# Patient Record
Sex: MALE | Race: WHITE | NOT HISPANIC OR LATINO | URBAN - METROPOLITAN AREA
[De-identification: names, ages, dates, MRNs, and addresses within clinical notes are randomized per-mention and may not be internally consistent; named-entity substitution may affect disease eponyms.]

---

## 2023-03-08 ENCOUNTER — NURSING HOME VISIT (OUTPATIENT)
Dept: POST ACUTE CARE | Facility: EXTERNAL LOCATION | Age: 88
End: 2023-03-08
Payer: MEDICARE

## 2023-03-08 DIAGNOSIS — I10 HYPERTENSION, UNSPECIFIED TYPE: Primary | ICD-10-CM

## 2023-03-08 DIAGNOSIS — E78.5 HYPERLIPIDEMIA, UNSPECIFIED HYPERLIPIDEMIA TYPE: ICD-10-CM

## 2023-03-08 DIAGNOSIS — H35.30 MACULAR DEGENERATION OF BOTH EYES, UNSPECIFIED TYPE: ICD-10-CM

## 2023-03-08 DIAGNOSIS — H91.93 BILATERAL HEARING LOSS, UNSPECIFIED HEARING LOSS TYPE: ICD-10-CM

## 2023-03-08 PROBLEM — H91.90 HEARING LOSS: Status: ACTIVE | Noted: 2023-03-08

## 2023-03-08 PROCEDURE — 99349 HOME/RES VST EST MOD MDM 40: CPT

## 2023-03-08 NOTE — PROGRESS NOTES
Subjective   Chief complaint: Rich Medina is a 89 y.o. male who is a acute skilled care patient being seen and evaluated for multiple medical problems.  Patient presents for general medical care and follow-up.     HPI:  The patient was found lying in bed while his wife was getting evaluated in the living room of their apartment.  The patient was seen and examined at the bedside after he was awoken from his nap.  The patient is very hard of hearing and can barely see in front of him. The patient has a history of mac degeneration, HTN, Hearing loss, and HLD. He denies pain and feels okay. No issues with having BM's or urinating. The home is set up with in a very particular way so the patient can safely maneuver around on his own safely and meds organized on the counter.         Review of Systems  All systems reviewed and negative except for what was mentioned in the HPI    Vital signs: 122/64, 65, 16, 98%, 97.2    Objective   Physical Exam  Constitutional:       General: He is not in acute distress.  Eyes:      Extraocular Movements: Extraocular movements intact.   Cardiovascular:      Rate and Rhythm: Regular rhythm.   Pulmonary:      Effort: Pulmonary effort is normal.      Breath sounds: Normal breath sounds.   Abdominal:      General: Bowel sounds are normal.      Palpations: Abdomen is soft.   Musculoskeletal:      Cervical back: Neck supple.      Right lower leg: No edema.      Left lower leg: No edema.   Neurological:      Mental Status: He is alert.   Psychiatric:         Mood and Affect: Mood normal.         Behavior: Behavior is cooperative.         Assessment/Plan   Problem List Items Addressed This Visit          Circulatory    HTN (hypertension) - Primary     HCTZ, check VS monthly, monitor CBC and BMP prn            Other    HLD (hyperlipidemia)     cont statin, monitor lipid panel prn, eating whatever sounds good to him         Macular degeneration     stable, no changes in vision recently          Hearing loss     cont B/L hearing aids          Medications, treatments, and labs reviewed  Continue medications and treatments as listed in [facility]

## 2023-04-11 ENCOUNTER — NURSING HOME VISIT (OUTPATIENT)
Dept: POST ACUTE CARE | Facility: EXTERNAL LOCATION | Age: 88
End: 2023-04-11
Payer: MEDICARE

## 2023-04-11 DIAGNOSIS — H35.30 MACULAR DEGENERATION OF BOTH EYES, UNSPECIFIED TYPE: ICD-10-CM

## 2023-04-11 DIAGNOSIS — E78.5 HYPERLIPIDEMIA, UNSPECIFIED HYPERLIPIDEMIA TYPE: ICD-10-CM

## 2023-04-11 DIAGNOSIS — H91.93 BILATERAL HEARING LOSS, UNSPECIFIED HEARING LOSS TYPE: ICD-10-CM

## 2023-04-11 DIAGNOSIS — I15.9 SECONDARY HYPERTENSION: Primary | ICD-10-CM

## 2023-04-11 PROCEDURE — 99305 1ST NF CARE MODERATE MDM 35: CPT | Performed by: INTERNAL MEDICINE

## 2023-04-11 NOTE — PROGRESS NOTES
HISTORY & PHYSICAL    Subjective   Chief complaint: Rich Medina is a 89 y.o. male who is a acute skilled care patient being seen and evaluated for multiple medical problems.  Patient presents for weakness.    HPI:  The patient is a 89 year old male with a past medical history of macular degeneration, HTN, hearing loss and HLD. Pt was sent to SNF due to difficulty hearing, nurse states he can barely see in front of him. Pt will stay at SNF for further care and therapy.         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    Family History   Problem Relation Name Age of Onset    No Known Problems Mother      No Known Problems Father         Social History     Socioeconomic History    Marital status:      Spouse name: Not on file    Number of children: Not on file    Years of education: Not on file    Highest education level: Not on file   Occupational History    Not on file   Tobacco Use    Smoking status: Not on file    Smokeless tobacco: Not on file   Vaping Use    Vaping status: Not on file   Substance and Sexual Activity    Alcohol use: Not on file    Drug use: Not on file    Sexual activity: Not on file   Other Topics Concern    Not on file   Social History Narrative    Not on file     Social Determinants of Health     Financial Resource Strain: Not on file   Food Insecurity: Not on file   Transportation Needs: Not on file   Physical Activity: Not on file   Stress: Not on file   Social Connections: Not on file   Intimate Partner Violence: Not on file   Housing Stability: Not on file       Vital signs: 124/64, 97.8, 74, 18, 97%    Objective   Physical Exam  Vitals reviewed.   Constitutional:       Appearance: Normal appearance.   HENT:      Head: Normocephalic and atraumatic.   Cardiovascular:      Rate and Rhythm:  Normal rate and regular rhythm.   Pulmonary:      Effort: Pulmonary effort is normal.      Breath sounds: Normal breath sounds.   Abdominal:      General: Bowel sounds are normal.      Palpations: Abdomen is soft.   Musculoskeletal:      Cervical back: Neck supple.   Skin:     General: Skin is warm and dry.   Neurological:      General: No focal deficit present.      Mental Status: He is alert.   Psychiatric:         Mood and Affect: Mood normal.         Behavior: Behavior is cooperative.         Assessment/Plan   Problem List Items Addressed This Visit          Circulatory    HTN (hypertension) - Primary       Other    HLD (hyperlipidemia)    Macular degeneration    Hearing loss     Medications, treatments, and labs reviewed  Continue medications and treatments as listed in [facility]    Scribe Attestation  I, Tika Oliveira   attest that this documentation has been prepared under the direction and in the presence of Mikhail Gracia MD.    Provider Attestation - Scribe documentation  All medical record entries made by the Scribe were at my direction and personally dictated by me. I have reviewed the chart and agree that the record accurately reflects my personal performance of the history, physical exam, discussion and plan.    Mikhail Gracia MD

## 2023-04-11 NOTE — LETTER
Patient: Rich Medina  : 1928    Encounter Date: 2023    HISTORY & PHYSICAL    Subjective  Chief complaint: Rich Medina is a 89 y.o. male who is a acute skilled care patient being seen and evaluated for multiple medical problems.  Patient presents for weakness.    HPI:  The patient is a 89 year old male with a past medical history of macular degeneration, HTN, hearing loss and HLD. Pt was sent to SNF due to difficulty hearing, nurse states he can barely see in front of him. Pt will stay at SNF for further care and therapy.         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    Family History   Problem Relation Name Age of Onset   • No Known Problems Mother     • No Known Problems Father         Social History     Socioeconomic History   • Marital status:      Spouse name: Not on file   • Number of children: Not on file   • Years of education: Not on file   • Highest education level: Not on file   Occupational History   • Not on file   Tobacco Use   • Smoking status: Not on file   • Smokeless tobacco: Not on file   Vaping Use   • Vaping status: Not on file   Substance and Sexual Activity   • Alcohol use: Not on file   • Drug use: Not on file   • Sexual activity: Not on file   Other Topics Concern   • Not on file   Social History Narrative   • Not on file     Social Determinants of Health     Financial Resource Strain: Not on file   Food Insecurity: Not on file   Transportation Needs: Not on file   Physical Activity: Not on file   Stress: Not on file   Social Connections: Not on file   Intimate Partner Violence: Not on file   Housing Stability: Not on file       Vital signs: 124/64, 97.8, 74, 18, 97%    Objective  Physical Exam  Vitals reviewed.   Constitutional:       Appearance: Normal appearance.    HENT:      Head: Normocephalic and atraumatic.   Cardiovascular:      Rate and Rhythm: Normal rate and regular rhythm.   Pulmonary:      Effort: Pulmonary effort is normal.      Breath sounds: Normal breath sounds.   Abdominal:      General: Bowel sounds are normal.      Palpations: Abdomen is soft.   Musculoskeletal:      Cervical back: Neck supple.   Skin:     General: Skin is warm and dry.   Neurological:      General: No focal deficit present.      Mental Status: He is alert.   Psychiatric:         Mood and Affect: Mood normal.         Behavior: Behavior is cooperative.         Assessment/Plan  Problem List Items Addressed This Visit          Circulatory    HTN (hypertension) - Primary       Other    HLD (hyperlipidemia)    Macular degeneration    Hearing loss     Medications, treatments, and labs reviewed  Continue medications and treatments as listed in [facility]    Scribe Attestation  IAdina Scribe   attest that this documentation has been prepared under the direction and in the presence of Mikhail Gracia MD.    Provider Attestation - Scribe documentation  All medical record entries made by the Scribe were at my direction and personally dictated by me. I have reviewed the chart and agree that the record accurately reflects my personal performance of the history, physical exam, discussion and plan.    Mikhail Gracia MD          Electronically Signed By: Mikhail Gracia MD   4/11/23  7:08 PM

## 2023-04-13 ENCOUNTER — NURSING HOME VISIT (OUTPATIENT)
Dept: POST ACUTE CARE | Facility: EXTERNAL LOCATION | Age: 88
End: 2023-04-13
Payer: MEDICARE

## 2023-04-13 DIAGNOSIS — R53.1 WEAKNESS: ICD-10-CM

## 2023-04-13 DIAGNOSIS — R62.7 FAILURE TO THRIVE IN ADULT: ICD-10-CM

## 2023-04-13 PROCEDURE — 99308 SBSQ NF CARE LOW MDM 20: CPT | Performed by: INTERNAL MEDICINE

## 2023-04-13 NOTE — LETTER
Patient: Rich Medina  : 1928    Encounter Date: 2023    PROGRESS NOTE    Subjective  Chief complaint: Rich Medina is a 89 y.o. male who is an acute skilled patient being seen and evaluated for weakness    HPI:  Patient working in therapy due to weakness. HE requires moderate assistance for transfers. Nurse called and reported patient had episode of unresponsiveness. Patient has been having deterioration in condition and cognition as well as increased confusion. Presently he is resting comfortable. No other concerns at this time.       Objective  Vital signs: 112/68, 96%    Physical Exam  Constitutional:       General: He is not in acute distress.     Comments: Increased lethargy   Eyes:      Extraocular Movements: Extraocular movements intact.   Cardiovascular:      Rate and Rhythm: Normal rate and regular rhythm.   Pulmonary:      Effort: Pulmonary effort is normal.      Breath sounds: Normal breath sounds.   Abdominal:      General: Bowel sounds are normal.      Palpations: Abdomen is soft.   Musculoskeletal:         General: Normal range of motion.      Cervical back: Normal range of motion and neck supple.      Right lower leg: No edema.      Left lower leg: No edema.   Psychiatric:         Behavior: Behavior is cooperative.         Assessment/Plan  Problem List Items Addressed This Visit          Endocrine/Metabolic    Failure to thrive in adult     Noted deterioration  Increased confusion  Family contemplating Hospice care and comfort masures            Other    Weakness     Therapy          Medications, treatments, and labs reviewed  Continue medications and treatments as listed in [facility]    Scribe Attestation  By signing my name below, Yi PIRES Scribe   attest that this documentation has been prepared under the direction and in the presence of Mikhail Gracia MD.    Provider Attestation - Scribe documentation  All medical record entries made by the Scribe were at my direction and  personally dictated by me. I have reviewed the chart and agree that the record accurately reflects my personal performance of the history, physical exam, discussion and plan.  1. Failure to thrive in adult        2. Weakness               Electronically Signed By: Mikhail Gracia MD   4/18/23  6:29 PM

## 2023-04-17 PROBLEM — R62.7 FAILURE TO THRIVE IN ADULT: Status: ACTIVE | Noted: 2023-04-17

## 2023-04-17 PROBLEM — R53.1 WEAKNESS: Status: ACTIVE | Noted: 2023-04-17

## 2023-04-17 NOTE — PROGRESS NOTES
PROGRESS NOTE    Subjective   Chief complaint: Rich Medina is a 89 y.o. male who is an acute skilled patient being seen and evaluated for weakness    HPI:  Patient working in therapy due to weakness. HE requires moderate assistance for transfers. Nurse called and reported patient had episode of unresponsiveness. Patient has been having deterioration in condition and cognition as well as increased confusion. Presently he is resting comfortable. No other concerns at this time.       Objective   Vital signs: 112/68, 96%    Physical Exam  Constitutional:       General: He is not in acute distress.     Comments: Increased lethargy   Eyes:      Extraocular Movements: Extraocular movements intact.   Cardiovascular:      Rate and Rhythm: Normal rate and regular rhythm.   Pulmonary:      Effort: Pulmonary effort is normal.      Breath sounds: Normal breath sounds.   Abdominal:      General: Bowel sounds are normal.      Palpations: Abdomen is soft.   Musculoskeletal:         General: Normal range of motion.      Cervical back: Normal range of motion and neck supple.      Right lower leg: No edema.      Left lower leg: No edema.   Psychiatric:         Behavior: Behavior is cooperative.         Assessment/Plan   Problem List Items Addressed This Visit          Endocrine/Metabolic    Failure to thrive in adult     Noted deterioration  Increased confusion  Family contemplating Hospice care and comfort masures            Other    Weakness     Therapy          Medications, treatments, and labs reviewed  Continue medications and treatments as listed in [facility]    Scribe Attestation  By signing my name below, I, Tiak Chisholm   attest that this documentation has been prepared under the direction and in the presence of Mikhail Gracia MD.    Provider Attestation - Scribe documentation  All medical record entries made by the Scribe were at my direction and personally dictated by me. I have reviewed the chart and agree that the  record accurately reflects my personal performance of the history, physical exam, discussion and plan.  1. Failure to thrive in adult        2. Weakness

## 2023-04-18 ENCOUNTER — NURSING HOME VISIT (OUTPATIENT)
Dept: POST ACUTE CARE | Facility: EXTERNAL LOCATION | Age: 88
End: 2023-04-18
Payer: MEDICARE

## 2023-04-18 DIAGNOSIS — R53.1 WEAKNESS: ICD-10-CM

## 2023-04-18 DIAGNOSIS — R62.7 FAILURE TO THRIVE IN ADULT: ICD-10-CM

## 2023-04-18 PROCEDURE — 99308 SBSQ NF CARE LOW MDM 20: CPT | Performed by: INTERNAL MEDICINE

## 2023-04-18 NOTE — LETTER
Patient: Rich Medina  : 1928    Encounter Date: 2023    PROGRESS NOTE    Subjective  Chief complaint: Rich Medina is a 89 y.o. male who is an acute skilled patient being seen and evaluated for weakness    HPI:    Therapy continues to work with patient.  Patient is weaker and continues to have a decline in condition.  Family still considering hospice.  No new issues or concerns.  No acute distress.      Objective  Vital signs: 126/74, 98%    Physical Exam  Constitutional:       General: He is not in acute distress.  Eyes:      Extraocular Movements: Extraocular movements intact.   Cardiovascular:      Rate and Rhythm: Normal rate and regular rhythm.   Pulmonary:      Effort: Pulmonary effort is normal.      Breath sounds: Normal breath sounds.   Abdominal:      General: Bowel sounds are normal.      Palpations: Abdomen is soft.   Musculoskeletal:         General: Normal range of motion.      Cervical back: Normal range of motion and neck supple.      Right lower leg: No edema.      Left lower leg: No edema.   Neurological:      Mental Status: He is alert.   Psychiatric:         Mood and Affect: Mood normal.         Behavior: Behavior is cooperative.         Assessment/Plan  Problem List Items Addressed This Visit          Endocrine/Metabolic    Failure to thrive in adult     Noted deterioration  Increased confusion  Family considering Hospice             Other    Weakness     Therapy          Medications, treatments, and labs reviewed  Continue medications and treatments as listed in [facility]    Mikhail Gracia MD    1. Failure to thrive in adult        2. Weakness             Scribe Attestation  By signing my name below, IYi Scribe   attest that this documentation has been prepared under the direction and in the presence of Mikhail Gracia MD.    Provider Attestation - Scribe documentation  All medical record entries made by the Scribe were at my direction and personally dictated by me. I  have reviewed the chart and agree that the record accurately reflects my personal performance of the history, physical exam, discussion and plan.      Electronically Signed By: Mikhail Gracia MD   4/20/23  6:07 PM

## 2023-04-20 ENCOUNTER — NURSING HOME VISIT (OUTPATIENT)
Dept: POST ACUTE CARE | Facility: EXTERNAL LOCATION | Age: 88
End: 2023-04-20
Payer: MEDICARE

## 2023-04-20 DIAGNOSIS — R53.1 WEAKNESS: ICD-10-CM

## 2023-04-20 DIAGNOSIS — R62.7 FAILURE TO THRIVE IN ADULT: ICD-10-CM

## 2023-04-20 PROCEDURE — 99308 SBSQ NF CARE LOW MDM 20: CPT | Performed by: INTERNAL MEDICINE

## 2023-04-20 NOTE — LETTER
Patient: Rich Medina  : 1928    Encounter Date: 2023    PROGRESS NOTE    Subjective  Chief complaint: Rich Medina is a 89 y.o. male who is an acute skilled patient being seen and evaluated for weakness    HPI:  Patient with increased weakness working in therapy due to weakness. Requires moderate assistance for transfers and ADL's. Patient with continued noted deterioration. No new issues at this time. No acute distress.       Objective  Vital signs: 113/76, 98%    Physical Exam  Constitutional:       General: He is not in acute distress.     Appearance: He is ill-appearing.   Eyes:      Extraocular Movements: Extraocular movements intact.   Cardiovascular:      Rate and Rhythm: Normal rate and regular rhythm.   Pulmonary:      Effort: Pulmonary effort is normal.      Breath sounds: Normal breath sounds.   Abdominal:      General: Bowel sounds are normal.      Palpations: Abdomen is soft.   Musculoskeletal:         General: Normal range of motion.      Cervical back: Normal range of motion and neck supple.      Right lower leg: No edema.      Left lower leg: No edema.   Neurological:      Mental Status: He is alert.   Psychiatric:         Mood and Affect: Mood normal.         Behavior: Behavior is cooperative.         Assessment/Plan  Problem List Items Addressed This Visit          Endocrine/Metabolic    Failure to thrive in adult     Noted deterioration  Increased confusion  Family considering Hospice             Other    Weakness     Therapy          Medications, treatments, and labs reviewed  Continue medications and treatments as listed in [facility]    Mikhail Gracia MD    1. Weakness        2. Failure to thrive in adult             Scribe Attestation  By signing my name below, Yi PIRES, Scribe   attest that this documentation has been prepared under the direction and in the presence of Mikhail Gracia MD.    Provider Attestation - Scribe documentation  All medical record entries made by the  Scribe were at my direction and personally dictated by me. I have reviewed the chart and agree that the record accurately reflects my personal performance of the history, physical exam, discussion and plan.      Electronically Signed By: Mikhail Gracia MD   4/24/23  7:22 PM

## 2023-04-20 NOTE — PROGRESS NOTES
PROGRESS NOTE    Subjective   Chief complaint: Rich Medina is a 89 y.o. male who is an acute skilled patient being seen and evaluated for weakness    HPI:    Therapy continues to work with patient.  Patient is weaker and continues to have a decline in condition.  Family still considering hospice.  No new issues or concerns.  No acute distress.      Objective   Vital signs: 126/74, 98%    Physical Exam  Constitutional:       General: He is not in acute distress.  Eyes:      Extraocular Movements: Extraocular movements intact.   Cardiovascular:      Rate and Rhythm: Normal rate and regular rhythm.   Pulmonary:      Effort: Pulmonary effort is normal.      Breath sounds: Normal breath sounds.   Abdominal:      General: Bowel sounds are normal.      Palpations: Abdomen is soft.   Musculoskeletal:         General: Normal range of motion.      Cervical back: Normal range of motion and neck supple.      Right lower leg: No edema.      Left lower leg: No edema.   Neurological:      Mental Status: He is alert.   Psychiatric:         Mood and Affect: Mood normal.         Behavior: Behavior is cooperative.         Assessment/Plan   Problem List Items Addressed This Visit          Endocrine/Metabolic    Failure to thrive in adult     Noted deterioration  Increased confusion  Family considering Hospice             Other    Weakness     Therapy          Medications, treatments, and labs reviewed  Continue medications and treatments as listed in [facility]    Mikhail Gracia MD    1. Failure to thrive in adult        2. Weakness             Scribe Attestation  By signing my name below, I, Tika Chisholm   attest that this documentation has been prepared under the direction and in the presence of Mikhail Gracia MD.    Provider Attestation - Scribe documentation  All medical record entries made by the Scribe were at my direction and personally dictated by me. I have reviewed the chart and agree that the record accurately reflects  my personal performance of the history, physical exam, discussion and plan.

## 2023-04-24 NOTE — PROGRESS NOTES
PROGRESS NOTE    Subjective   Chief complaint: Rich Medina is a 89 y.o. male who is an acute skilled patient being seen and evaluated for weakness    HPI:  Patient with increased weakness working in therapy due to weakness. Requires moderate assistance for transfers and ADL's. Patient with continued noted deterioration. No new issues at this time. No acute distress.       Objective   Vital signs: 113/76, 98%    Physical Exam  Constitutional:       General: He is not in acute distress.     Appearance: He is ill-appearing.   Eyes:      Extraocular Movements: Extraocular movements intact.   Cardiovascular:      Rate and Rhythm: Normal rate and regular rhythm.   Pulmonary:      Effort: Pulmonary effort is normal.      Breath sounds: Normal breath sounds.   Abdominal:      General: Bowel sounds are normal.      Palpations: Abdomen is soft.   Musculoskeletal:         General: Normal range of motion.      Cervical back: Normal range of motion and neck supple.      Right lower leg: No edema.      Left lower leg: No edema.   Neurological:      Mental Status: He is alert.   Psychiatric:         Mood and Affect: Mood normal.         Behavior: Behavior is cooperative.         Assessment/Plan   Problem List Items Addressed This Visit          Endocrine/Metabolic    Failure to thrive in adult     Noted deterioration  Increased confusion  Family considering Hospice             Other    Weakness     Therapy          Medications, treatments, and labs reviewed  Continue medications and treatments as listed in [facility]    Mikhail Gracia MD    1. Weakness        2. Failure to thrive in adult             Scribe Attestation  By signing my name below, I, Tika Chisholm   attest that this documentation has been prepared under the direction and in the presence of Mikhail Gracia MD.    Provider Attestation - Scribe documentation  All medical record entries made by the Scribe were at my direction and personally dictated by me. I have  reviewed the chart and agree that the record accurately reflects my personal performance of the history, physical exam, discussion and plan.

## 2023-04-25 ENCOUNTER — NURSING HOME VISIT (OUTPATIENT)
Dept: POST ACUTE CARE | Facility: EXTERNAL LOCATION | Age: 88
End: 2023-04-25
Payer: MEDICARE

## 2023-04-25 DIAGNOSIS — R53.1 WEAKNESS: ICD-10-CM

## 2023-04-25 PROCEDURE — 99308 SBSQ NF CARE LOW MDM 20: CPT | Performed by: INTERNAL MEDICINE

## 2023-04-25 NOTE — LETTER
Patient: Rich Medina  : 1928    Encounter Date: 2023    PROGRESS NOTE    Subjective  Chief complaint: Rich Medina is a 89 y.o. male who is a long term care patient being seen and evaluated for weakness    HPI:  Patient continues to work in physical and occupational therapies.  Patient requires assistance for transfers ADLs and mobility.  He continues to have a general decline in condition.  He is weaker and oftentimes more lethargic.  No new issues or concerns.  No acute distress.      Objective  Vital signs: 116/67, 97%    Physical Exam  Constitutional:       General: He is not in acute distress.  Eyes:      Extraocular Movements: Extraocular movements intact.   Cardiovascular:      Rate and Rhythm: Normal rate and regular rhythm.   Pulmonary:      Effort: Pulmonary effort is normal.      Breath sounds: Normal breath sounds.   Abdominal:      General: Bowel sounds are normal.      Palpations: Abdomen is soft.   Musculoskeletal:         General: Normal range of motion.      Cervical back: Neck supple.      Right lower leg: No edema.      Left lower leg: No edema.   Neurological:      Mental Status: He is alert.   Psychiatric:         Mood and Affect: Mood normal.         Behavior: Behavior is cooperative.         Assessment/Plan  Problem List Items Addressed This Visit          Other    Weakness     Therapy  +weakness          Medications, treatments, and labs reviewed  Continue medications and treatments as listed in [facility]    Scribe Attestation  By signing my name below, I, Tika Chisholm   attest that this documentation has been prepared under the direction and in the presence of Mikhail Gracia MD.    Provider Attestation - Scribe documentation  All medical record entries made by the Scribe were at my direction and personally dictated by me. I have reviewed the chart and agree that the record accurately reflects my personal performance of the history, physical exam, discussion and  plan.    1. Weakness               Electronically Signed By: Mikhail Gracia MD   4/26/23  5:45 PM

## 2023-04-26 NOTE — PROGRESS NOTES
PROGRESS NOTE    Subjective   Chief complaint: Rich Medina is a 89 y.o. male who is a long term care patient being seen and evaluated for weakness    HPI:  Patient continues to work in physical and occupational therapies.  Patient requires assistance for transfers ADLs and mobility.  He continues to have a general decline in condition.  He is weaker and oftentimes more lethargic.  No new issues or concerns.  No acute distress.      Objective   Vital signs: 116/67, 97%    Physical Exam  Constitutional:       General: He is not in acute distress.  Eyes:      Extraocular Movements: Extraocular movements intact.   Cardiovascular:      Rate and Rhythm: Normal rate and regular rhythm.   Pulmonary:      Effort: Pulmonary effort is normal.      Breath sounds: Normal breath sounds.   Abdominal:      General: Bowel sounds are normal.      Palpations: Abdomen is soft.   Musculoskeletal:         General: Normal range of motion.      Cervical back: Neck supple.      Right lower leg: No edema.      Left lower leg: No edema.   Neurological:      Mental Status: He is alert.   Psychiatric:         Mood and Affect: Mood normal.         Behavior: Behavior is cooperative.         Assessment/Plan   Problem List Items Addressed This Visit          Other    Weakness     Therapy  +weakness          Medications, treatments, and labs reviewed  Continue medications and treatments as listed in [facility]    Scribe Attestation  By signing my name below, IYi Scribe   attest that this documentation has been prepared under the direction and in the presence of Mikhail Gracia MD.    Provider Attestation - Scribe documentation  All medical record entries made by the Scribe were at my direction and personally dictated by me. I have reviewed the chart and agree that the record accurately reflects my personal performance of the history, physical exam, discussion and plan.    1. Weakness

## 2023-05-02 ENCOUNTER — NURSING HOME VISIT (OUTPATIENT)
Dept: POST ACUTE CARE | Facility: EXTERNAL LOCATION | Age: 88
End: 2023-05-02
Payer: MEDICARE

## 2023-05-02 DIAGNOSIS — H91.93 BILATERAL HEARING LOSS, UNSPECIFIED HEARING LOSS TYPE: ICD-10-CM

## 2023-05-02 DIAGNOSIS — E78.5 HYPERLIPIDEMIA, UNSPECIFIED HYPERLIPIDEMIA TYPE: ICD-10-CM

## 2023-05-02 DIAGNOSIS — I15.9 SECONDARY HYPERTENSION: ICD-10-CM

## 2023-05-02 PROCEDURE — 99309 SBSQ NF CARE MODERATE MDM 30: CPT | Performed by: INTERNAL MEDICINE

## 2023-05-02 NOTE — LETTER
Patient: Rich Medina  : 1928    Encounter Date: 2023    PROGRESS NOTE    Subjective  Chief complaint: Rich Medina is a 89 y.o. male who is a long term care patient being seen and evaluated for monthly general medical care and follow-up.    HPI:  Patient presents for general medical care and f/u.  Patient seen and examined at bedside.  No issues per nursing.  Patient has no acute complaints.    HTN,  Denies chest pain and headache.  Denies muscle aches. Patient with hearing loss has hearing aides and is doing well with them No acute distress.       Objective  Vital signs: 126/73, 98%    Physical Exam  Constitutional:       General: He is not in acute distress.  Eyes:      Extraocular Movements: Extraocular movements intact.   Cardiovascular:      Rate and Rhythm: Normal rate and regular rhythm.   Pulmonary:      Effort: Pulmonary effort is normal.      Breath sounds: Normal breath sounds.   Abdominal:      General: Bowel sounds are normal.      Palpations: Abdomen is soft.   Musculoskeletal:      Cervical back: Neck supple.      Right lower leg: No edema.      Left lower leg: No edema.   Neurological:      Mental Status: He is alert.   Psychiatric:         Mood and Affect: Mood normal.         Behavior: Behavior is cooperative.         Assessment/Plan  Problem List Items Addressed This Visit          Circulatory    HTN (hypertension)     HCTZ, check VS monthly, monitor CBC and BMP prn            Other    HLD (hyperlipidemia)     cont statin, monitor lipid panel prn,          Hearing loss     cont B/L hearing aids          Medications, treatments, and labs reviewed  Continue medications and treatments as listed in [facility]    Scribe Attestation  By signing my name below, Yi PIRES Scribtiki   attest that this documentation has been prepared under the direction and in the presence of Mikhail Gracia MD.    Provider Attestation - Scribe documentation  All medical record entries made by the Scribe were  at my direction and personally dictated by me. I have reviewed the chart and agree that the record accurately reflects my personal performance of the history, physical exam, discussion and plan.    1. Hyperlipidemia, unspecified hyperlipidemia type        2. Secondary hypertension        3. Bilateral hearing loss, unspecified hearing loss type               Electronically Signed By: Mikhail Gracia MD   5/3/23  4:24 PM

## 2023-05-03 NOTE — PROGRESS NOTES
PROGRESS NOTE    Subjective   Chief complaint: Rich Mdeina is a 89 y.o. male who is a long term care patient being seen and evaluated for monthly general medical care and follow-up.    HPI:  Patient presents for general medical care and f/u.  Patient seen and examined at bedside.  No issues per nursing.  Patient has no acute complaints.    HTN,  Denies chest pain and headache.  Denies muscle aches. Patient with hearing loss has hearing aides and is doing well with them No acute distress.       Objective   Vital signs: 126/73, 98%    Physical Exam  Constitutional:       General: He is not in acute distress.  Eyes:      Extraocular Movements: Extraocular movements intact.   Cardiovascular:      Rate and Rhythm: Normal rate and regular rhythm.   Pulmonary:      Effort: Pulmonary effort is normal.      Breath sounds: Normal breath sounds.   Abdominal:      General: Bowel sounds are normal.      Palpations: Abdomen is soft.   Musculoskeletal:      Cervical back: Neck supple.      Right lower leg: No edema.      Left lower leg: No edema.   Neurological:      Mental Status: He is alert.   Psychiatric:         Mood and Affect: Mood normal.         Behavior: Behavior is cooperative.         Assessment/Plan   Problem List Items Addressed This Visit          Circulatory    HTN (hypertension)     HCTZ, check VS monthly, monitor CBC and BMP prn            Other    HLD (hyperlipidemia)     cont statin, monitor lipid panel prn,          Hearing loss     cont B/L hearing aids          Medications, treatments, and labs reviewed  Continue medications and treatments as listed in [facility]    Scribe Attestation  By signing my name below, Yi PIRES Scribe   attest that this documentation has been prepared under the direction and in the presence of Mikhail Gracia MD.    Provider Attestation - Scribe documentation  All medical record entries made by the Scribe were at my direction and personally dictated by me. I have reviewed the  chart and agree that the record accurately reflects my personal performance of the history, physical exam, discussion and plan.    1. Hyperlipidemia, unspecified hyperlipidemia type        2. Secondary hypertension        3. Bilateral hearing loss, unspecified hearing loss type

## 2023-05-04 ENCOUNTER — NURSING HOME VISIT (OUTPATIENT)
Dept: POST ACUTE CARE | Facility: EXTERNAL LOCATION | Age: 88
End: 2023-05-04
Payer: MEDICARE

## 2023-05-04 DIAGNOSIS — R53.1 WEAKNESS: ICD-10-CM

## 2023-05-04 DIAGNOSIS — I15.9 SECONDARY HYPERTENSION: ICD-10-CM

## 2023-05-04 PROCEDURE — 99308 SBSQ NF CARE LOW MDM 20: CPT | Performed by: INTERNAL MEDICINE

## 2023-05-04 NOTE — LETTER
Patient: Rich Medina  : 1928    Encounter Date: 2023    PROGRESS NOTE    Subjective  Chief complaint: Rich Medina is a 89 y.o. 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  Patient requires assistance for transfers ADLs and mobility.  Patient denies pain or discomfort.  No new issues or concerns.  No acute distress.      Objective  Vital signs: 126/73, 97%    Physical Exam  Constitutional:       General: He is not in acute distress.  Eyes:      Extraocular Movements: Extraocular movements intact.   Cardiovascular:      Rate and Rhythm: Normal rate and regular rhythm.   Pulmonary:      Effort: Pulmonary effort is normal.      Breath sounds: Normal breath sounds.   Abdominal:      General: Bowel sounds are normal.      Palpations: Abdomen is soft.   Musculoskeletal:      Cervical back: Neck supple.      Right lower leg: No edema.      Left lower leg: No edema.      Comments: Gen weakness   Neurological:      Mental Status: He is alert.   Psychiatric:         Mood and Affect: Mood normal.         Behavior: Behavior is cooperative.         Assessment/Plan  Problem List Items Addressed This Visit          Circulatory    HTN (hypertension)     HCTZ, check VS monthly, monitor CBC and BMP prn            Other    Weakness     Therapy  +weakness          Medications, treatments, and labs reviewed  Continue medications and treatments as listed in [facility]    Scribe Attestation  By signing my name below, I, Tika Chisholm   attest that this documentation has been prepared under the direction and in the presence of Mikhail Gracia MD.    Provider Attestation - Scribe documentation  All medical record entries made by the Scribe were at my direction and personally dictated by me. I have reviewed the chart and agree that the record accurately reflects my personal performance of the history,  physical exam, discussion and plan.    1. Weakness        2. Secondary hypertension               Electronically Signed By: Mikhail Gracia MD   5/5/23  3:23 PM

## 2023-05-05 NOTE — PROGRESS NOTES
PROGRESS NOTE    Subjective   Chief complaint: Rich Medina is a 89 y.o. 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  Patient requires assistance for transfers ADLs and mobility.  Patient denies pain or discomfort.  No new issues or concerns.  No acute distress.      Objective   Vital signs: 126/73, 97%    Physical Exam  Constitutional:       General: He is not in acute distress.  Eyes:      Extraocular Movements: Extraocular movements intact.   Cardiovascular:      Rate and Rhythm: Normal rate and regular rhythm.   Pulmonary:      Effort: Pulmonary effort is normal.      Breath sounds: Normal breath sounds.   Abdominal:      General: Bowel sounds are normal.      Palpations: Abdomen is soft.   Musculoskeletal:      Cervical back: Neck supple.      Right lower leg: No edema.      Left lower leg: No edema.      Comments: Gen weakness   Neurological:      Mental Status: He is alert.   Psychiatric:         Mood and Affect: Mood normal.         Behavior: Behavior is cooperative.         Assessment/Plan   Problem List Items Addressed This Visit          Circulatory    HTN (hypertension)     HCTZ, check VS monthly, monitor CBC and BMP prn            Other    Weakness     Therapy  +weakness          Medications, treatments, and labs reviewed  Continue medications and treatments as listed in [facility]    Scribe Attestation  By signing my name below, IYi Scribe   attest that this documentation has been prepared under the direction and in the presence of Mikhail Gracia MD.    Provider Attestation - Scribe documentation  All medical record entries made by the Scribe were at my direction and personally dictated by me. I have reviewed the chart and agree that the record accurately reflects my personal performance of the history, physical exam, discussion and plan.    1. Weakness        2. Secondary  hypertension

## 2023-05-09 ENCOUNTER — NURSING HOME VISIT (OUTPATIENT)
Dept: POST ACUTE CARE | Facility: EXTERNAL LOCATION | Age: 88
End: 2023-05-09
Payer: MEDICARE

## 2023-05-09 DIAGNOSIS — F03.90 DEMENTIA, UNSPECIFIED DEMENTIA SEVERITY, UNSPECIFIED DEMENTIA TYPE, UNSPECIFIED WHETHER BEHAVIORAL, PSYCHOTIC, OR MOOD DISTURBANCE OR ANXIETY (MULTI): ICD-10-CM

## 2023-05-09 DIAGNOSIS — R53.1 WEAKNESS: ICD-10-CM

## 2023-05-09 PROCEDURE — 99309 SBSQ NF CARE MODERATE MDM 30: CPT | Performed by: INTERNAL MEDICINE

## 2023-05-09 NOTE — LETTER
Patient: Rich Medina  : 1928    Encounter Date: 2023    PROGRESS NOTE    Subjective  Chief complaint: Rich Medina is a 89 y.o. male who is a long term care patient being seen and evaluated for worsening confusion    HPI:  Patient with dementia continues to work in therapy. He continues to have a general decline in condition and has had worsening confusion as well. Denies pain or discomfort. Continue to require assistance for transfers, ADL's and mobility. No further concerns at this time.       Objective  Vital signs: 127/76, 98%    Physical Exam  Constitutional:       General: He is not in acute distress.  Eyes:      Extraocular Movements: Extraocular movements intact.   Cardiovascular:      Rate and Rhythm: Normal rate and regular rhythm.   Pulmonary:      Effort: Pulmonary effort is normal.      Breath sounds: Normal breath sounds.   Abdominal:      General: Bowel sounds are normal.      Palpations: Abdomen is soft.   Musculoskeletal:      Cervical back: Neck supple.      Right lower leg: No edema.      Left lower leg: No edema.   Neurological:      Mental Status: He is alert.   Psychiatric:         Mood and Affect: Mood normal.         Behavior: Behavior is cooperative.         Assessment/Plan  Problem List Items Addressed This Visit          Nervous    Dementia (CMS/HCC)     Worsening confusion  Continue therapy  Monitor for continued decline            Other    Weakness     Therapy  +weakness          Medications, treatments, and labs reviewed  Continue medications and treatments as listed in [facility]    Scribe Attestation  By signing my name below, I, Tika Chisholm   attest that this documentation has been prepared under the direction and in the presence of Mikhail Gracia MD.    Provider Attestation - Scribe documentation  All medical record entries made by the Scribe were at my direction and personally dictated by me. I have reviewed the chart and agree that the record accurately  reflects my personal performance of the history, physical exam, discussion and plan.    1. Weakness        2. Dementia, unspecified dementia severity, unspecified dementia type, unspecified whether behavioral, psychotic, or mood disturbance or anxiety (CMS/[facility])               Electronically Signed By: Mikhail Gracia MD   5/12/23 11:24 AM

## 2023-05-12 PROBLEM — F03.90 DEMENTIA (MULTI): Status: ACTIVE | Noted: 2023-05-12

## 2023-05-12 NOTE — PROGRESS NOTES
PROGRESS NOTE    Subjective   Chief complaint: Rich Medina is a 89 y.o. male who is a long term care patient being seen and evaluated for worsening confusion    HPI:  Patient with dementia continues to work in therapy. He continues to have a general decline in condition and has had worsening confusion as well. Denies pain or discomfort. Continue to require assistance for transfers, ADL's and mobility. No further concerns at this time.       Objective   Vital signs: 127/76, 98%    Physical Exam  Constitutional:       General: He is not in acute distress.  Eyes:      Extraocular Movements: Extraocular movements intact.   Cardiovascular:      Rate and Rhythm: Normal rate and regular rhythm.   Pulmonary:      Effort: Pulmonary effort is normal.      Breath sounds: Normal breath sounds.   Abdominal:      General: Bowel sounds are normal.      Palpations: Abdomen is soft.   Musculoskeletal:      Cervical back: Neck supple.      Right lower leg: No edema.      Left lower leg: No edema.   Neurological:      Mental Status: He is alert.   Psychiatric:         Mood and Affect: Mood normal.         Behavior: Behavior is cooperative.         Assessment/Plan   Problem List Items Addressed This Visit          Nervous    Dementia (CMS/[facility])     Worsening confusion  Continue therapy  Monitor for continued decline            Other    Weakness     Therapy  +weakness          Medications, treatments, and labs reviewed  Continue medications and treatments as listed in [facility]    Scribe Attestation  By signing my name below, IYi Scribe   attest that this documentation has been prepared under the direction and in the presence of Mikhail Gracia MD.    Provider Attestation - Scribe documentation  All medical record entries made by the Scribe were at my direction and personally dictated by me. I have reviewed the chart and agree that the record accurately reflects my personal performance of the history, physical exam, discussion  and plan.    1. Weakness        2. Dementia, unspecified dementia severity, unspecified dementia type, unspecified whether behavioral, psychotic, or mood disturbance or anxiety (CMS/[facility])

## 2023-05-16 ENCOUNTER — NURSING HOME VISIT (OUTPATIENT)
Dept: POST ACUTE CARE | Facility: EXTERNAL LOCATION | Age: 88
End: 2023-05-16
Payer: MEDICARE

## 2023-05-16 DIAGNOSIS — R53.1 WEAKNESS: ICD-10-CM

## 2023-05-16 PROCEDURE — 99308 SBSQ NF CARE LOW MDM 20: CPT | Performed by: INTERNAL MEDICINE

## 2023-05-16 NOTE — LETTER
Patient: Rich Medina  : 1928    Encounter Date: 2023    PROGRESS NOTE    Subjective  Chief complaint: Rich Medina is a 89 y.o. male who is a long term care patient being seen and evaluated for worsening confusion    HPI:  Patient with dementia continues to work in therapy. Continue to require assistance for transfers, ADL's and mobility. No further concerns at this time.       Objective  Vital signs: 127/76, 98%    Physical Exam  Constitutional:       General: He is not in acute distress.  Eyes:      Extraocular Movements: Extraocular movements intact.   Cardiovascular:      Rate and Rhythm: Normal rate and regular rhythm.   Pulmonary:      Effort: Pulmonary effort is normal.      Breath sounds: Normal breath sounds.   Abdominal:      General: Bowel sounds are normal.      Palpations: Abdomen is soft.   Musculoskeletal:      Cervical back: Neck supple.      Right lower leg: No edema.      Left lower leg: No edema.   Neurological:      Mental Status: He is alert.   Psychiatric:         Mood and Affect: Mood normal.         Behavior: Behavior is cooperative.         Assessment/Plan  Problem List Items Addressed This Visit          Other    Weakness     Therapy  +weakness        Medications, treatments, and labs reviewed  Continue medications and treatments as listed in [facility]    Scribe Attestation  By signing my name below, IYi Scribe   attest that this documentation has been prepared under the direction and in the presence of Mikhail Gracia MD.    Provider Attestation - Scribe documentation  All medical record entries made by the Scribe were at my direction and personally dictated by me. I have reviewed the chart and agree that the record accurately reflects my personal performance of the history, physical exam, discussion and plan.    1. Weakness               Electronically Signed By: Mikhail Gracia MD   23  8:17 PM

## 2023-05-17 NOTE — PROGRESS NOTES
PROGRESS NOTE    Subjective   Chief complaint: Rich Medina is a 89 y.o. male who is a long term care patient being seen and evaluated for worsening confusion    HPI:  Patient with dementia continues to work in therapy. Continue to require assistance for transfers, ADL's and mobility. No further concerns at this time.       Objective   Vital signs: 127/76, 98%    Physical Exam  Constitutional:       General: He is not in acute distress.  Eyes:      Extraocular Movements: Extraocular movements intact.   Cardiovascular:      Rate and Rhythm: Normal rate and regular rhythm.   Pulmonary:      Effort: Pulmonary effort is normal.      Breath sounds: Normal breath sounds.   Abdominal:      General: Bowel sounds are normal.      Palpations: Abdomen is soft.   Musculoskeletal:      Cervical back: Neck supple.      Right lower leg: No edema.      Left lower leg: No edema.   Neurological:      Mental Status: He is alert.   Psychiatric:         Mood and Affect: Mood normal.         Behavior: Behavior is cooperative.         Assessment/Plan   Problem List Items Addressed This Visit          Other    Weakness     Therapy  +weakness        Medications, treatments, and labs reviewed  Continue medications and treatments as [facility] in [facility]    Scribe Attestation  By signing my name below, I, Tika Chisholm   attest that this documentation has been prepared under the direction and in the presence of Mikhail Gracia MD.    Provider Attestation - Scribe documentation  All medical record entries made by the Scribe were at my direction and personally dictated by me. I have reviewed the chart and agree that the record accurately reflects my personal performance of the history, physical exam, discussion and plan.    1. Weakness

## 2023-05-23 ENCOUNTER — NURSING HOME VISIT (OUTPATIENT)
Dept: POST ACUTE CARE | Facility: EXTERNAL LOCATION | Age: 88
End: 2023-05-23
Payer: MEDICARE

## 2023-05-23 DIAGNOSIS — F03.90 DEMENTIA, UNSPECIFIED DEMENTIA SEVERITY, UNSPECIFIED DEMENTIA TYPE, UNSPECIFIED WHETHER BEHAVIORAL, PSYCHOTIC, OR MOOD DISTURBANCE OR ANXIETY (MULTI): ICD-10-CM

## 2023-05-23 DIAGNOSIS — R53.1 WEAKNESS: ICD-10-CM

## 2023-05-23 PROCEDURE — 99308 SBSQ NF CARE LOW MDM 20: CPT | Performed by: INTERNAL MEDICINE

## 2023-05-23 NOTE — LETTER
Patient: Rich Medina  : 1928    Encounter Date: 2023    PROGRESS NOTE    Subjective  Chief complaint: Rich Medina is a 89 y.o. male who is a long term care patient being seen and evaluated for worsening confusion    HPI:  Patient with dementia continues to work in therapy. He is weak and requires assistance for transfers, ADL's and mobility. Denies constitutional symptoms, No new concerns at this time.       Objective  Vital signs: 127/76, 98%    Physical Exam  Constitutional:       General: He is not in acute distress.  Eyes:      Extraocular Movements: Extraocular movements intact.   Cardiovascular:      Rate and Rhythm: Normal rate and regular rhythm.   Pulmonary:      Effort: Pulmonary effort is normal.      Breath sounds: Normal breath sounds.   Abdominal:      General: Bowel sounds are normal.      Palpations: Abdomen is soft.   Musculoskeletal:         General: Normal range of motion.      Cervical back: Normal range of motion and neck supple.      Right lower leg: No edema.      Left lower leg: No edema.   Neurological:      Mental Status: He is alert.   Psychiatric:         Mood and Affect: Mood normal.         Behavior: Behavior is cooperative.         Assessment/Plan  Problem List Items Addressed This Visit          Nervous    Dementia (CMS/HCC)     Worsening confusion  Continue therapy  Monitor for continued decline            Other    Weakness     Therapy  +weakness        Medications, treatments, and labs reviewed  Continue medications and treatments as listed in [facility]    Scribe Attestation  By signing my name below, I, Tika Chisholm   attest that this documentation has been prepared under the direction and in the presence of Mikhail Gracia MD.    Provider Attestation - Scribe documentation  All medical record entries made by the Scribe were at my direction and personally dictated by me. I have reviewed the chart and agree that the record accurately reflects my personal  performance of the history, physical exam, discussion and plan.    1. Dementia, unspecified dementia severity, unspecified dementia type, unspecified whether behavioral, psychotic, or mood disturbance or anxiety (CMS/[facility])        2. Weakness               Electronically Signed By: Mikhail Gracia MD   5/24/23  4:18 PM

## 2023-05-24 NOTE — PROGRESS NOTES
PROGRESS NOTE    Subjective   Chief complaint: Rich Medina is a 89 y.o. male who is a long term care patient being seen and evaluated for worsening confusion    HPI:  Patient with dementia continues to work in therapy. He is weak and requires assistance for transfers, ADL's and mobility. Denies constitutional symptoms, No new concerns at this time.       Objective   Vital signs: 127/76, 98%    Physical Exam  Constitutional:       General: He is not in acute distress.  Eyes:      Extraocular Movements: Extraocular movements intact.   Cardiovascular:      Rate and Rhythm: Normal rate and regular rhythm.   Pulmonary:      Effort: Pulmonary effort is normal.      Breath sounds: Normal breath sounds.   Abdominal:      General: Bowel sounds are normal.      Palpations: Abdomen is soft.   Musculoskeletal:         General: Normal range of motion.      Cervical back: Normal range of motion and neck supple.      Right lower leg: No edema.      Left lower leg: No edema.   Neurological:      Mental Status: He is alert.   Psychiatric:         Mood and Affect: Mood normal.         Behavior: Behavior is cooperative.         Assessment/Plan   Problem List Items Addressed This Visit          Nervous    Dementia (CMS/HCC)     Worsening confusion  Continue therapy  Monitor for continued decline            Other    Weakness     Therapy  +weakness        Medications, treatments, and labs reviewed  Continue medications and treatments as listed in [facility]    Scribe Attestation  By signing my name below, IYi Scribe   attest that this documentation has been prepared under the direction and in the presence of Mikhail Gracia MD.    Provider Attestation - Scribe documentation  All medical record entries made by the Scribe were at my direction and personally dictated by me. I have reviewed the chart and agree that the record accurately reflects my personal performance of the history, physical exam, discussion and plan.    1.  Dementia, unspecified dementia severity, unspecified dementia type, unspecified whether behavioral, psychotic, or mood disturbance or anxiety (CMS/[facility])        2. Weakness

## 2023-05-30 ENCOUNTER — NURSING HOME VISIT (OUTPATIENT)
Dept: POST ACUTE CARE | Facility: EXTERNAL LOCATION | Age: 88
End: 2023-05-30
Payer: MEDICARE

## 2023-05-30 DIAGNOSIS — R53.1 WEAKNESS: ICD-10-CM

## 2023-05-30 PROCEDURE — 99308 SBSQ NF CARE LOW MDM 20: CPT | Performed by: INTERNAL MEDICINE

## 2023-05-30 NOTE — LETTER
Patient: Rich Medina  : 1928    Encounter Date: 2023    PROGRESS NOTE    Subjective  Chief complaint: Rich Medina is a 89 y.o. male who is a long term care patient being seen and evaluated for weakness    HPI:  Patient was discharged from therapy and continues on functional maintenance plan with restorative program. He is working in walking to and from the dining room. No acute distress or new issues at this time.       Objective  Vital signs: 126/74, 98%    Physical Exam  Constitutional:       General: He is not in acute distress.  Eyes:      Extraocular Movements: Extraocular movements intact.   Cardiovascular:      Rate and Rhythm: Normal rate and regular rhythm.   Pulmonary:      Effort: Pulmonary effort is normal.      Breath sounds: Normal breath sounds.   Abdominal:      General: Bowel sounds are normal.      Palpations: Abdomen is soft.   Musculoskeletal:      Cervical back: Neck supple.      Right lower leg: No edema.      Left lower leg: No edema.   Neurological:      Mental Status: He is alert.   Psychiatric:         Mood and Affect: Mood normal.         Behavior: Behavior is cooperative.         Assessment/Plan  Problem List Items Addressed This Visit          Other    Weakness     Dc'd from therapy  Continue FMP and restorative nursing          Medications, treatments, and labs reviewed  Continue medications and treatments as listed in [facility]    Scribe Attestation  By signing my name below, IYi Scribe   attest that this documentation has been prepared under the direction and in the presence of Mikhail Gracia MD.    Provider Attestation - Scribe documentation  All medical record entries made by the Scribe were at my direction and personally dictated by me. I have reviewed the chart and agree that the record accurately reflects my personal performance of the history, physical exam, discussion and plan.    1. Weakness               Electronically Signed By: Mikhail Gracia MD    6/1/23  8:08 PM

## 2023-06-01 NOTE — PROGRESS NOTES
PROGRESS NOTE    Subjective   Chief complaint: Rich Medina is a 89 y.o. male who is a long term care patient being seen and evaluated for weakness    HPI:  Patient was discharged from therapy and continues on functional maintenance plan with restorative program. He is working in walking to and from the dining room. No acute distress or new issues at this time.       Objective   Vital signs: 126/74, 98%    Physical Exam  Constitutional:       General: He is not in acute distress.  Eyes:      Extraocular Movements: Extraocular movements intact.   Cardiovascular:      Rate and Rhythm: Normal rate and regular rhythm.   Pulmonary:      Effort: Pulmonary effort is normal.      Breath sounds: Normal breath sounds.   Abdominal:      General: Bowel sounds are normal.      Palpations: Abdomen is soft.   Musculoskeletal:      Cervical back: Neck supple.      Right lower leg: No edema.      Left lower leg: No edema.   Neurological:      Mental Status: He is alert.   Psychiatric:         Mood and Affect: Mood normal.         Behavior: Behavior is cooperative.         Assessment/Plan   Problem List Items Addressed This Visit          Other    Weakness     Dc'd from therapy  Continue FMP and restorative nursing          Medications, treatments, and labs reviewed  Continue medications and treatments as listed in [facility]    Scribe Attestation  By signing my name below, IYi Scribe   attest that this documentation has been prepared under the direction and in the presence of Mikhail Gracia MD.    Provider Attestation - Scribe documentation  All medical record entries made by the Scribe were at my direction and personally dictated by me. I have reviewed the chart and agree that the record accurately reflects my personal performance of the history, physical exam, discussion and plan.    1. Weakness

## 2023-06-06 ENCOUNTER — NURSING HOME VISIT (OUTPATIENT)
Dept: POST ACUTE CARE | Facility: EXTERNAL LOCATION | Age: 88
End: 2023-06-06
Payer: MEDICARE

## 2023-06-06 DIAGNOSIS — I15.9 SECONDARY HYPERTENSION: ICD-10-CM

## 2023-06-06 DIAGNOSIS — E78.5 HYPERLIPIDEMIA, UNSPECIFIED HYPERLIPIDEMIA TYPE: ICD-10-CM

## 2023-06-06 DIAGNOSIS — F03.90 DEMENTIA, UNSPECIFIED DEMENTIA SEVERITY, UNSPECIFIED DEMENTIA TYPE, UNSPECIFIED WHETHER BEHAVIORAL, PSYCHOTIC, OR MOOD DISTURBANCE OR ANXIETY (MULTI): ICD-10-CM

## 2023-06-06 PROCEDURE — 99309 SBSQ NF CARE MODERATE MDM 30: CPT | Performed by: INTERNAL MEDICINE

## 2023-06-06 NOTE — LETTER
Patient: Rihc Medina  : 1928    Encounter Date: 2023    PROGRESS NOTE    Subjective  Chief complaint: Rich Medina is a 89 y.o. male who is a long term care patient being seen and evaluated for monthly general medical care and follow-up.    HPI:  Patient presents for general medical care and f/u.  Patient seen and examined at bedside.  No issues per nursing.  Patient has no acute complaints.  HTN,  Denies chest pain and headache.  Denies muscle aches. Patient denies headache or chest pain. No acute distress.       Objective  Vital signs: 126/73, 98%    Physical Exam  Constitutional:       General: He is not in acute distress.  Eyes:      Extraocular Movements: Extraocular movements intact.   Cardiovascular:      Rate and Rhythm: Normal rate and regular rhythm.   Pulmonary:      Effort: Pulmonary effort is normal.      Breath sounds: Normal breath sounds.   Abdominal:      General: Bowel sounds are normal.      Palpations: Abdomen is soft.   Musculoskeletal:      Cervical back: Neck supple.      Right lower leg: No edema.      Left lower leg: No edema.   Neurological:      Mental Status: He is alert.   Psychiatric:         Mood and Affect: Mood normal.         Behavior: Behavior is cooperative.         Assessment/Plan  Problem List Items Addressed This Visit          Nervous    Dementia (CMS/HCC)     Mentation at baseline  Monitor for changes            Circulatory    HTN (hypertension)     HCTZ, check VS monthly, monitor CBC and BMP prn            Other    HLD (hyperlipidemia)     cont statin,   monitor lipid panel prn,         Medications, treatments, and labs reviewed  Continue medications and treatments as listed in [facility]    Scribe Attestation  By signing my name below, Yi PIRES Scribe   attest that this documentation has been prepared under the direction and in the presence of Mikhail Gracia MD.    Provider Attestation - Scribe documentation  All medical record entries made by the Scribe  were at my direction and personally dictated by me. I have reviewed the chart and agree that the record accurately reflects my personal performance of the history, physical exam, discussion and plan.    1. Secondary hypertension        2. Hyperlipidemia, unspecified hyperlipidemia type        3. Dementia, unspecified dementia severity, unspecified dementia type, unspecified whether behavioral, psychotic, or mood disturbance or anxiety (CMS/[facility])               Electronically Signed By: Mikhail Gracia MD   6/7/23  5:53 PM

## 2023-06-07 NOTE — PROGRESS NOTES
PROGRESS NOTE    Subjective   Chief complaint: Rich Medina is a 89 y.o. male who is a long term care patient being seen and evaluated for monthly general medical care and follow-up.    HPI:  Patient presents for general medical care and f/u.  Patient seen and examined at bedside.  No issues per nursing.  Patient has no acute complaints.  HTN,  Denies chest pain and headache.  Denies muscle aches. Patient denies headache or chest pain. No acute distress.       Objective   Vital signs: 126/73, 98%    Physical Exam  Constitutional:       General: He is not in acute distress.  Eyes:      Extraocular Movements: Extraocular movements intact.   Cardiovascular:      Rate and Rhythm: Normal rate and regular rhythm.   Pulmonary:      Effort: Pulmonary effort is normal.      Breath sounds: Normal breath sounds.   Abdominal:      General: Bowel sounds are normal.      Palpations: Abdomen is soft.   Musculoskeletal:      Cervical back: Neck supple.      Right lower leg: No edema.      Left lower leg: No edema.   Neurological:      Mental Status: He is alert.   Psychiatric:         Mood and Affect: Mood normal.         Behavior: Behavior is cooperative.         Assessment/Plan   Problem List Items Addressed This Visit          Nervous    Dementia (CMS/HCC)     Mentation at baseline  Monitor for changes            Circulatory    HTN (hypertension)     HCTZ, check VS monthly, monitor CBC and BMP prn            Other    HLD (hyperlipidemia)     cont statin,   monitor lipid panel prn,         Medications, treatments, and labs reviewed  Continue medications and treatments as listed in [facility]    Scribe Attestation  By signing my name below, Yi PIRES Scribe   attest that this documentation has been prepared under the direction and in the presence of Mikhail Gracia MD.    Provider Attestation - Scribe documentation  All medical record entries made by the Scribe were at my direction and personally dictated by me. I have reviewed  the chart and agree that the record accurately reflects my personal performance of the history, physical exam, discussion and plan.    1. Secondary hypertension        2. Hyperlipidemia, unspecified hyperlipidemia type        3. Dementia, unspecified dementia severity, unspecified dementia type, unspecified whether behavioral, psychotic, or mood disturbance or anxiety (CMS/[facility])

## 2023-06-16 ENCOUNTER — NURSING HOME VISIT (OUTPATIENT)
Dept: POST ACUTE CARE | Facility: EXTERNAL LOCATION | Age: 88
End: 2023-06-16
Payer: MEDICARE

## 2023-06-16 DIAGNOSIS — I15.9 SECONDARY HYPERTENSION: ICD-10-CM

## 2023-06-16 DIAGNOSIS — M25.561 ACUTE PAIN OF RIGHT KNEE: ICD-10-CM

## 2023-06-16 DIAGNOSIS — F03.90 DEMENTIA, UNSPECIFIED DEMENTIA SEVERITY, UNSPECIFIED DEMENTIA TYPE, UNSPECIFIED WHETHER BEHAVIORAL, PSYCHOTIC, OR MOOD DISTURBANCE OR ANXIETY (MULTI): Primary | ICD-10-CM

## 2023-06-16 PROCEDURE — 99308 SBSQ NF CARE LOW MDM 20: CPT

## 2023-06-16 NOTE — PROGRESS NOTES
PROGRESS NOTE    Subjective   Chief complaint: Rich Medina is a 89 y.o. male who is an acute skilled patient being seen and evaluated for weakness and recent fall with R knee injury    HPI:  6//16/2023 Patient seen and evaluated while laying in bed. Recent fall w/ R knee injury. XR negative. R knee swollen and ecchymotic. Limited ROM w/ facial grimacing and moaning. No new concerns per nursing staff.      Objective   Vital signs: 127/72-73-18-97%    Physical Exam  HENT:      Head: Normocephalic.      Mouth/Throat:      Mouth: Mucous membranes are moist.   Eyes:      Conjunctiva/sclera: Conjunctivae normal.   Cardiovascular:      Rate and Rhythm: Normal rate. Rhythm irregular.      Pulses: Normal pulses.      Heart sounds: Normal heart sounds.   Pulmonary:      Effort: Pulmonary effort is normal.      Breath sounds: Normal breath sounds.   Abdominal:      General: Abdomen is flat. Bowel sounds are normal.      Palpations: Abdomen is soft.   Musculoskeletal:         General: Normal range of motion.      Cervical back: Normal range of motion.      Comments: R knee swollen and ecchymotic  Limited ROM    Skin:     General: Skin is warm and dry.      Capillary Refill: Capillary refill takes less than 2 seconds.   Neurological:      Mental Status: He is alert. Mental status is at baseline. He is disoriented.   Psychiatric:         Behavior: Behavior normal.         Assessment/Plan   Problem List Items Addressed This Visit       HTN (hypertension)     BP at goal  Continue to monitor         Dementia (CMS/[facility]) - Primary     Baseline mentation  No aggressive or combative behaviors  No agitation           Acute pain of right knee     R knee pain s/p fall  Negative XR  Ace wrap and elevate  Tylenol PRN          Medications, treatments, and labs reviewed  Continue medications and treatments as listed in EMR    Nichelle Jiang, APRN-CNP

## 2023-06-16 NOTE — LETTER
Patient: Rich Medina  : 1928    Encounter Date: 2023    PROGRESS NOTE    Subjective  Chief complaint: Rich Medina is a 89 y.o. male who is an acute skilled patient being seen and evaluated for weakness and recent fall with R knee injury    HPI:  2023 Patient seen and evaluated while laying in bed. Recent fall w/ R knee injury. XR negative. R knee swollen and ecchymotic. Limited ROM w/ facial grimacing and moaning. No new concerns per nursing staff.      Objective  Vital signs: 127/72-73-18-97%    Physical Exam  HENT:      Head: Normocephalic.      Mouth/Throat:      Mouth: Mucous membranes are moist.   Eyes:      Conjunctiva/sclera: Conjunctivae normal.   Cardiovascular:      Rate and Rhythm: Normal rate. Rhythm irregular.      Pulses: Normal pulses.      Heart sounds: Normal heart sounds.   Pulmonary:      Effort: Pulmonary effort is normal.      Breath sounds: Normal breath sounds.   Abdominal:      General: Abdomen is flat. Bowel sounds are normal.      Palpations: Abdomen is soft.   Musculoskeletal:         General: Normal range of motion.      Cervical back: Normal range of motion.      Comments: R knee swollen and ecchymotic  Limited ROM    Skin:     General: Skin is warm and dry.      Capillary Refill: Capillary refill takes less than 2 seconds.   Neurological:      Mental Status: He is alert. Mental status is at baseline. He is disoriented.   Psychiatric:         Behavior: Behavior normal.         Assessment/Plan  Problem List Items Addressed This Visit       HTN (hypertension)     BP at goal  Continue to monitor         Dementia (CMS/HCC) - Primary     Baseline mentation  No aggressive or combative behaviors  No agitation           Acute pain of right knee     R knee pain s/p fall  Negative XR  Ace wrap and elevate  Tylenol PRN          Medications, treatments, and labs reviewed  Continue medications and treatments as listed in EMR    Nichelle Jiang, APRN-CNP      Electronically  Signed By: Nichelle Jiang, RENNY-CNP   6/19/23 11:34 AM

## 2023-07-06 ENCOUNTER — NURSING HOME VISIT (OUTPATIENT)
Dept: POST ACUTE CARE | Facility: EXTERNAL LOCATION | Age: 88
End: 2023-07-06
Payer: MEDICARE

## 2023-07-06 DIAGNOSIS — E78.5 HYPERLIPIDEMIA, UNSPECIFIED HYPERLIPIDEMIA TYPE: ICD-10-CM

## 2023-07-06 DIAGNOSIS — I15.9 SECONDARY HYPERTENSION: ICD-10-CM

## 2023-07-06 DIAGNOSIS — F03.90 DEMENTIA, UNSPECIFIED DEMENTIA SEVERITY, UNSPECIFIED DEMENTIA TYPE, UNSPECIFIED WHETHER BEHAVIORAL, PSYCHOTIC, OR MOOD DISTURBANCE OR ANXIETY (MULTI): ICD-10-CM

## 2023-07-06 PROCEDURE — 99309 SBSQ NF CARE MODERATE MDM 30: CPT | Performed by: INTERNAL MEDICINE

## 2023-07-06 NOTE — LETTER
Patient: Rich Medina  : 1928    Encounter Date: 2023    PROGRESS NOTE    Subjective  Chief complaint: Rich Medina is a 89 y.o. male who is a long term care patient being seen and evaluated for monthly general medical care and follow-up.    HPI:  Patient presents for general medical care and f/u.  Patient seen and examined at bedside.  No issues per nursing.  Patient has no acute complaints.  HTN,  Denies chest pain and headache.  Denies muscle aches. Patient denies headache or chest pain. No acute distress.       Objective  Vital signs: 126/73, 98%    Physical Exam  Constitutional:       General: He is not in acute distress.  Eyes:      Extraocular Movements: Extraocular movements intact.   Cardiovascular:      Rate and Rhythm: Normal rate and regular rhythm.   Pulmonary:      Effort: Pulmonary effort is normal.      Breath sounds: Normal breath sounds.   Abdominal:      General: Bowel sounds are normal.      Palpations: Abdomen is soft.   Musculoskeletal:      Cervical back: Neck supple.      Right lower leg: No edema.      Left lower leg: No edema.   Neurological:      Mental Status: He is alert.   Psychiatric:         Mood and Affect: Mood normal.         Behavior: Behavior is cooperative.         Assessment/Plan  Problem List Items Addressed This Visit          Cardiac and Vasculature    HTN (hypertension)     BP at goal  Continue to monitor         HLD (hyperlipidemia)     cont statin,   monitor lipid panel prn,             Neuro    Dementia (CMS/HCC)     Baseline mentation  No aggressive or combative behaviors  No agitation          Medications, treatments, and labs reviewed  Continue medications and treatments as listed in [facility]    Scribe Attestation  By signing my name below, Yi PIRES Scribtiki   attest that this documentation has been prepared under the direction and in the presence of Mikhail Gracia MD.    Provider Attestation - Scribe documentation  All medical record entries made  by the Scribe were at my direction and personally dictated by me. I have reviewed the chart and agree that the record accurately reflects my personal performance of the history, physical exam, discussion and plan.    1. Dementia, unspecified dementia severity, unspecified dementia type, unspecified whether behavioral, psychotic, or mood disturbance or anxiety (CMS/[facility])        2. Secondary hypertension        3. Hyperlipidemia, unspecified hyperlipidemia type               Electronically Signed By: Mikhail Gracia MD   7/6/23 12:54 PM

## 2023-07-06 NOTE — PROGRESS NOTES
PROGRESS NOTE    Subjective   Chief complaint: Rich Medina is a 89 y.o. male who is a long term care patient being seen and evaluated for monthly general medical care and follow-up.    HPI:  Patient presents for general medical care and f/u.  Patient seen and examined at bedside.  No issues per nursing.  Patient has no acute complaints.  HTN,  Denies chest pain and headache.  Denies muscle aches. Patient denies headache or chest pain. No acute distress.       Objective   Vital signs: 126/73, 98%    Physical Exam  Constitutional:       General: He is not in acute distress.  Eyes:      Extraocular Movements: Extraocular movements intact.   Cardiovascular:      Rate and Rhythm: Normal rate and regular rhythm.   Pulmonary:      Effort: Pulmonary effort is normal.      Breath sounds: Normal breath sounds.   Abdominal:      General: Bowel sounds are normal.      Palpations: Abdomen is soft.   Musculoskeletal:      Cervical back: Neck supple.      Right lower leg: No edema.      Left lower leg: No edema.   Neurological:      Mental Status: He is alert.   Psychiatric:         Mood and Affect: Mood normal.         Behavior: Behavior is cooperative.         Assessment/Plan   Problem List Items Addressed This Visit          Cardiac and Vasculature    HTN (hypertension)     BP at goal  Continue to monitor         HLD (hyperlipidemia)     cont statin,   monitor lipid panel prn,             Neuro    Dementia (CMS/[facility])     Baseline mentation  No aggressive or combative behaviors  No agitation          Medications, treatments, and labs reviewed  Continue medications and treatments as listed in [facility]    Scribe Attestation  By signing my name below, Yi PIRES Scribe   attest that this documentation has been prepared under the direction and in the presence of Mikhail Gracia MD.    Provider Attestation - Scribe documentation  All medical record entries made by the Scribe were at my direction and personally dictated by me. I  have reviewed the chart and agree that the record accurately reflects my personal performance of the history, physical exam, discussion and plan.    1. Dementia, unspecified dementia severity, unspecified dementia type, unspecified whether behavioral, psychotic, or mood disturbance or anxiety (CMS/[facility])        2. Secondary hypertension        3. Hyperlipidemia, unspecified hyperlipidemia type

## 2023-11-07 ENCOUNTER — APPOINTMENT (OUTPATIENT)
Dept: CARDIOLOGY | Facility: CLINIC | Age: 88
End: 2023-11-07
Payer: MEDICARE